# Patient Record
Sex: FEMALE | Race: WHITE | NOT HISPANIC OR LATINO | Employment: UNEMPLOYED | URBAN - METROPOLITAN AREA
[De-identification: names, ages, dates, MRNs, and addresses within clinical notes are randomized per-mention and may not be internally consistent; named-entity substitution may affect disease eponyms.]

---

## 2017-05-02 ENCOUNTER — OFFICE VISIT (OUTPATIENT)
Dept: OBGYN | Facility: CLINIC | Age: 57
End: 2017-05-02
Payer: COMMERCIAL

## 2017-05-02 VITALS
HEIGHT: 67 IN | TEMPERATURE: 98.1 F | WEIGHT: 132.8 LBS | SYSTOLIC BLOOD PRESSURE: 124 MMHG | BODY MASS INDEX: 20.84 KG/M2 | DIASTOLIC BLOOD PRESSURE: 84 MMHG

## 2017-05-02 DIAGNOSIS — Z87.42 HISTORY OF ABNORMAL CERVICAL PAP SMEAR: ICD-10-CM

## 2017-05-02 DIAGNOSIS — Z12.31 VISIT FOR SCREENING MAMMOGRAM: Primary | ICD-10-CM

## 2017-05-02 DIAGNOSIS — Z98.890 HISTORY OF LOOP ELECTRICAL EXCISION PROCEDURE (LEEP): ICD-10-CM

## 2017-05-02 DIAGNOSIS — Z71.6 ENCOUNTER FOR SMOKING CESSATION COUNSELING: ICD-10-CM

## 2017-05-02 DIAGNOSIS — Z00.00 ROUTINE GENERAL MEDICAL EXAMINATION AT A HEALTH CARE FACILITY: ICD-10-CM

## 2017-05-02 PROCEDURE — 88175 CYTOPATH C/V AUTO FLUID REDO: CPT | Performed by: OBSTETRICS & GYNECOLOGY

## 2017-05-02 PROCEDURE — 87624 HPV HI-RISK TYP POOLED RSLT: CPT | Performed by: OBSTETRICS & GYNECOLOGY

## 2017-05-02 PROCEDURE — 99386 PREV VISIT NEW AGE 40-64: CPT | Performed by: OBSTETRICS & GYNECOLOGY

## 2017-05-02 RX ORDER — VARENICLINE TARTRATE 1 MG/1
1 TABLET, FILM COATED ORAL 2 TIMES DAILY
Qty: 56 TABLET | Refills: 1 | Status: SHIPPED | OUTPATIENT
Start: 2017-05-02 | End: 2019-01-22

## 2017-05-02 NOTE — LETTER
May 12, 2017    Cait Yeager  [address] [facility] [address]    Dear Cait,  We are happy to inform you that your PAP smear result from 05/02/2017 is normal.  We are now able to do a follow up test on PAP smears. The DNA test is for HPV (Human Papilloma Virus). Cervical cancer is closely linked with certain types of HPV. Your result showed no evidence of HPV.  Therefore we recommend you return in 1 year for your next pap smear, annual exam and other preventive tests.  Please contact the clinic at [phone] with any questions.  Sincerely,    Dianne Maldonado MD

## 2017-05-02 NOTE — MR AVS SNAPSHOT
"              After Visit Summary   5/2/2017    Cait Yeager    MRN: [id number]           Patient Information     Date Of Birth          1960        Visit Information        Provider Department      5/2/2017 2:00 PM Dianne Maldonado MD [facility]        Today's Diagnoses     Visit for screening mammogram    -  1    History of abnormal cervical Pap smear        History of loop electrical excision procedure (LEEP)        Encounter for smoking cessation counseling        Routine general medical examination at a health care facility           Follow-ups after your visit        Future tests that were ordered for you today     Open Future Orders        Priority Expected Expires Ordered    MA Screening Digital Bilateral Routine  5/2/2018 5/2/2017            Who to contact     If you have questions or need follow up information about today's clinic visit or your schedule please contact [facility] directly at [phone].  Normal or non-critical lab and imaging results will be communicated to you by MyChart, letter or phone within 4 business days after the clinic has received the results. If you do not hear from us within 7 days, please contact the clinic through MyChart or phone. If you have a critical or abnormal lab result, we will notify you by phone as soon as possible.  Submit refill requests through [employer] or call your pharmacy and they will forward the refill request to us. Please allow 3 business days for your refill to be completed.          Additional Information About Your Visit        [employer] Information     [employer] lets you send messages to your doctor, view your test results, renew your prescriptions, schedule appointments and more. To sign up, go to [email or web address] . Click on \"Log in\" on the left side of the screen, which will take you to the Welcome page. Then click on \"Sign up Now\" on the right side of the page.     You will be asked to enter the access code " "listed below, as well as some personal information. Please follow the directions to create your username and password.     Your access code is: [email or web address]  Expires: 2017  4:21 PM     Your access code will  in 90 days. If you need help or a new code, please call your [facility] clinic or [phone].        Care EveryWhere ID     This is your Care EveryWhere ID. This could be used by other organizations to access your [facility] medical records  [id number]        Your Vitals Were     Temperature Height Last Period BMI (Body Mass Index)          98.1  F (36.7  C) (Oral) 5' 7\" (1.702 m) 2011 20.8 kg/m2         Blood Pressure from Last 3 Encounters:   17 124/84   10/14/15 132/86   13 108/72    Weight from Last 3 Encounters:   17 132 lb 12.8 oz (60.2 kg)   10/14/15 120 lb (54.4 kg)   13 130 lb (59 kg)              We Performed the Following     HPV High Risk Types DNA Cervical     Pap imaged thin layer diagnostic with HPV (select HPV order below)          Today's Medication Changes          These changes are accurate as of: 17  4:21 PM.  If you have any questions, ask your nurse or doctor.               Start taking these medicines.        Dose/Directions    * varenicline 0.5 MG X 11 & 1 MG X 42 tablet   Commonly known as:  CHANTIX STARTING MONTH YEISON   Used for:  Encounter for smoking cessation counseling   Started by:  Dianne Maldonado MD        Take 0.5 mg tab daily for 3 days, then 0.5 mg tab twice daily for 4 days, then 1 mg twice daily.   Quantity:  53 tablet   Refills:  0       * varenicline 1 MG tablet   Commonly known as:  CHANTIX   Used for:  Encounter for smoking cessation counseling   Started by:  Dianne Maldonado MD        Dose:  1 mg   Take 1 tablet (1 mg) by mouth 2 times daily   Quantity:  56 tablet   Refills:  1       * Notice:  This list has 2 medication(s) that are the same as other medications prescribed for you. Read the directions carefully, and ask your " doctor or other care provider to review them with you.      Stop taking these medicines if you haven't already. Please contact your care team if you have questions.     norethindrone-ethinyl estradiol 1-5 MG-MCG per tablet   Commonly known as:  FEMHRT 1/5   Stopped by:  Dianne Maldonado MD                Where to get your medicines      These medications were sent to [id number] - [place] - [address] AT [facility] & [address]  [address], [place] [address]     Phone:  [phone]     varenicline 0.5 MG X 11 & 1 MG X 42 tablet    varenicline 1 MG tablet                Primary Care Provider Office Phone # Fax #    Osman Mendoza [phone]       [facility] [address]  [facility] [address]        Thank you!     Thank you for choosing [facility]  for your care. Our goal is always to provide you with excellent care. Hearing back from our patients is one way we can continue to improve our services. Please take a few minutes to complete the written survey that you may receive in the mail after your visit with us. Thank you!             Your Updated Medication List - Protect others around you: Learn how to safely use, store and throw away your medicines at www.disposemymeds.org.          This list is accurate as of: 5/2/17  4:21 PM.  Always use your most recent med list.                   Brand Name Dispense Instructions for use    NO ACTIVE MEDICATIONS          * varenicline 0.5 MG X 11 & 1 MG X 42 tablet    CHANTIX STARTING MONTH YEISON    53 tablet    Take 0.5 mg tab daily for 3 days, then 0.5 mg tab twice daily for 4 days, then 1 mg twice daily.       * varenicline 1 MG tablet    CHANTIX    56 tablet    Take 1 tablet (1 mg) by mouth 2 times daily       * Notice:  This list has 2 medication(s) that are the same as other medications prescribed for you. Read the directions carefully, and ask your doctor or other care provider to  review them with you.

## 2017-05-02 NOTE — PROGRESS NOTES
Cait is a 57 year old  who presents for annual exam.   Postmenopausal since early 50s.  She is having no menopausal symptoms. No vaginal bleeding noted.     Besides routine health maintenance,  she would like to discuss smoking cessation. Down to 3-4 cigs/day but cannot quit on her own. Would like to try Chantix.  GYNECOLOGIC HISTORY:  She is sexually active with 1 male partner(s) and she is currently in monogamous relationship.    History sexually transmitted infections:No STD history  Estrogen replacement therapy: No    History of abnormal Pap smear: YES - updated in Problem List and Health Maintenance accordingly  Family history of breast CA: No  Family history of uterine/ovarian CA: No  Family history of colon CA: No    HEALTH MAINTENANCE:  Last Mammogram: due, ordered.  Pap; (  Lab Results   Component Value Date    PAP HSIL 2013    PAP LSIL 10/08/2012    PAP NIL 09/10/2010    )    HISTORY:  Obstetric History       T2      TAB0   SAB0   E0   M0   L2       # Outcome Date GA Lbr Mane/2nd Weight Sex Delivery Anes PTL Lv   2 Term            1 Term                 Past Medical History:   Diagnosis Date     HSIL (high grade squamous intraepithelial lesion) on Pap smear 13    NAOMI 2 & 3     LSIL (low grade squamous intraepithelial lesion) on Pap smear 10/8/12     NONSPECIFIC MEDICAL HISTORY     Lorain     Past Surgical History:   Procedure Laterality Date     C NONSPECIFIC PROCEDURE      T.L.     COLONOSCOPY  3/22/2013    Procedure: COLONOSCOPY;  Colonoscopy ;  Surgeon: Emmanuel Peterson MD;  Location: [facility]     HC ENLARGE BREAST WITH IMPLANT       LEEP TX, CERVICAL  13     Family History   Problem Relation Age of Onset     Eye Disorder Mother      cataracts     CANCER Maternal Grandfather      Social History     Social History     Marital status:      Spouse name: N/A     Number of children: N/A     Years of education: N/A     Social History Main Topics     Smoking  "status: Current Every Day Smoker     Types: Cigarettes     Last attempt to quit: 1/1/2009     Smokeless tobacco: None      Comment: Still occ smoking 3 cig/day     Alcohol use Yes      Comment: occasional     Drug use: No     Sexual activity: Yes     Partners: Female     Birth control/ protection: Surgical      Comment: KIMMY     Other Topics Concern     None     Social History Narrative       Current Outpatient Prescriptions:      varenicline (CHANTIX STARTING MONTH PAK) 0.5 MG X 11 & 1 MG X 42 tablet, Take 0.5 mg tab daily for 3 days, then 0.5 mg tab twice daily for 4 days, then 1 mg twice daily., Disp: 53 tablet, Rfl: 0     varenicline (CHANTIX) 1 MG tablet, Take 1 tablet (1 mg) by mouth 2 times daily, Disp: 56 tablet, Rfl: 1     NO ACTIVE MEDICATIONS, , Disp: , Rfl:   No Known Allergies    Past medical, surgical, social and family history were reviewed and updated in EPIC.    ROS:  12 point review of systems negative other than symptoms noted below.      EXAM:  /84 (BP Location: Right arm, Patient Position: Chair)  Temp 98.1  F (36.7  C) (Oral)  Ht 5' 7\" (1.702 m)  Wt 132 lb 12.8 oz (60.2 kg)  LMP 06/27/2011  BMI 20.8 kg/m2   BMI: Body mass index is 20.8 kg/(m^2).  Constitutional: healthy, alert and no distress  Head: Normocephalic. No masses, lesions, tenderness or abnormalities  Neck: Neck supple. Trachea midline. No adenopathy. Thyroid symmetric, normal size.   Cardiovascular: RRR.   Respiratory: Negative.   Breast: No nodularity, asymmetry or nipple discharge bilaterally.  Gastrointestinal: Abdomen soft, non-tender, non-distended. No masses, organomegaly  Vulva:  No external lesions, normal female hair distribution, no inguinal adenopathy.    Urethra:  Midline, non-tender, well supported, no discharge  Vagina:  Atrophic, no abnormal discharge, no lesions  Cervix: no lesions, no discharge  Uterus:  anteverted, smooth contour, without enlargement, mobile, and without tenderness  Ovaries:  No masses " appreciated, non-tender, mobile  Rectal Exam: deferred  Musculoskeletal: extremities normal  Skin: no suspicious lesions or rashes  Psychiatric: Affect appropriate, cooperative, mentation appears normal.     COUNSELING:   Reviewed preventive health counseling, as reflected in patient instructions       smoking cessation   reports that she has been smoking Cigarettes.  She does not have any smokeless tobacco history on file.  Tobacco Cessation Action Plan: Medication Therapy Management  (Referral to [facility])      FRAX Risk Assessment  ASSESSMENT:  57 year old  with satisfactory annual exam  (Z12.31) Visit for screening mammogram  (primary encounter diagnosis)  Comment:    Plan: MA Screening Digital Bilateral, HPV High Risk         Types DNA Cervical, Pap imaged thin layer         diagnostic with HPV (select HPV order below)            (Z87.898) History of abnormal cervical Pap smear  Comment:   Plan: HPV High Risk Types DNA Cervical, Pap imaged         thin layer diagnostic with HPV (select HPV         order below)        Will need repeat pap and HPV in one year if this is normal, then every 3 years for a total of 20 years from the LEEP.    (Z98.890) History of loop electrical excision procedure (LEEP)  Comment:   Plan: HPV High Risk Types DNA Cervical, Pap imaged         thin layer diagnostic with HPV (select HPV         order below)            (Z71.6,  Z72.0) Encounter for smoking cessation counseling  Comment:   Plan: varenicline (CHANTIX STARTING MONTH ) 0.5 MG        X 11 & 1 MG X 42 tablet, varenicline (CHANTIX)         1 MG tablet        Risks, benefits, and alternatives to the medication discussed and she would like to try. Prescription sent.    (Z00.00) Routine general medical examination at a health care facility  Comment:   Plan: Pap, mammogram updated. Follow up yearly. Pap and HPV again in one year due to LEEP.    Dianne Maldonado MD

## 2017-05-05 LAB
COPATH REPORT: NORMAL
PAP: NORMAL

## 2017-05-08 LAB
FINAL DIAGNOSIS: NORMAL
HPV HR 12 DNA CVX QL NAA+PROBE: NEGATIVE
HPV16 DNA SPEC QL NAA+PROBE: NEGATIVE
HPV18 DNA SPEC QL NAA+PROBE: NEGATIVE
SPECIMEN DESCRIPTION: NORMAL

## 2017-05-10 NOTE — PROGRESS NOTES
Pap done on 5/2/17. Please send nl pap and Neg HPV letter, ([id number]) for pap/HPV screen annual physical in 1 year.   Chart reviewed, HM updated.   Cc: provider to sign off on dx pap.     PLEASE NOTE: NEXT PAP SHOULD BE IN 1 YEAR.     BIANCA Land RN

## 2017-08-26 ENCOUNTER — HEALTH MAINTENANCE LETTER (OUTPATIENT)
Age: 57
End: 2017-08-26

## 2018-06-03 ENCOUNTER — HEALTH MAINTENANCE LETTER (OUTPATIENT)
Age: 58
End: 2018-06-03

## 2018-07-09 ENCOUNTER — HEALTH MAINTENANCE LETTER (OUTPATIENT)
Age: 58
End: 2018-07-09

## 2018-08-27 ENCOUNTER — HEALTH MAINTENANCE LETTER (OUTPATIENT)
Age: 58
End: 2018-08-27

## 2019-01-22 ENCOUNTER — OFFICE VISIT (OUTPATIENT)
Dept: FAMILY MEDICINE | Facility: CLINIC | Age: 59
End: 2019-01-22

## 2019-01-22 VITALS
HEART RATE: 75 BPM | OXYGEN SATURATION: 98 % | TEMPERATURE: 98.2 F | DIASTOLIC BLOOD PRESSURE: 60 MMHG | SYSTOLIC BLOOD PRESSURE: 110 MMHG

## 2019-01-22 DIAGNOSIS — Z12.4 SCREENING FOR CERVICAL CANCER: ICD-10-CM

## 2019-01-22 DIAGNOSIS — Z72.0 TOBACCO USE: ICD-10-CM

## 2019-01-22 DIAGNOSIS — Z11.59 NEED FOR HEPATITIS C SCREENING TEST: ICD-10-CM

## 2019-01-22 DIAGNOSIS — Z13.228 SCREENING FOR METABOLIC DISORDER: ICD-10-CM

## 2019-01-22 DIAGNOSIS — Z13.220 SCREENING FOR LIPID DISORDERS: ICD-10-CM

## 2019-01-22 DIAGNOSIS — Z98.890 HISTORY OF LOOP ELECTRICAL EXCISION PROCEDURE (LEEP): ICD-10-CM

## 2019-01-22 DIAGNOSIS — Z87.42 HISTORY OF ABNORMAL CERVICAL PAP SMEAR: ICD-10-CM

## 2019-01-22 DIAGNOSIS — Z71.89 ACP (ADVANCE CARE PLANNING): ICD-10-CM

## 2019-01-22 DIAGNOSIS — Z12.31 ENCOUNTER FOR SCREENING MAMMOGRAM FOR BREAST CANCER: ICD-10-CM

## 2019-01-22 DIAGNOSIS — Z01.419 ENCOUNTER FOR GYNECOLOGICAL EXAMINATION WITHOUT ABNORMAL FINDING: Primary | ICD-10-CM

## 2019-01-22 PROCEDURE — 88142 CYTOPATH C/V THIN LAYER: CPT | Mod: 90 | Performed by: PHYSICIAN ASSISTANT

## 2019-01-22 PROCEDURE — 99386 PREV VISIT NEW AGE 40-64: CPT | Performed by: PHYSICIAN ASSISTANT

## 2019-01-22 PROCEDURE — 36415 COLL VENOUS BLD VENIPUNCTURE: CPT | Performed by: PHYSICIAN ASSISTANT

## 2019-01-22 PROCEDURE — 87624 HPV HI-RISK TYP POOLED RSLT: CPT | Mod: 90 | Performed by: PHYSICIAN ASSISTANT

## 2019-01-22 PROCEDURE — 86803 HEPATITIS C AB TEST: CPT | Mod: 90 | Performed by: PHYSICIAN ASSISTANT

## 2019-01-22 PROCEDURE — 80061 LIPID PANEL: CPT | Mod: 90 | Performed by: PHYSICIAN ASSISTANT

## 2019-01-22 PROCEDURE — 80048 BASIC METABOLIC PNL TOTAL CA: CPT | Mod: 90 | Performed by: PHYSICIAN ASSISTANT

## 2019-01-22 SDOH — HEALTH STABILITY: MENTAL HEALTH: HOW OFTEN DO YOU HAVE 6 OR MORE DRINKS ON ONE OCCASION?: NEVER

## 2019-01-22 SDOH — HEALTH STABILITY: MENTAL HEALTH: HOW OFTEN DO YOU HAVE A DRINK CONTAINING ALCOHOL?: 2-3 TIMES A WEEK

## 2019-01-22 SDOH — HEALTH STABILITY: MENTAL HEALTH: HOW MANY STANDARD DRINKS CONTAINING ALCOHOL DO YOU HAVE ON A TYPICAL DAY?: 1 OR 2

## 2019-01-22 NOTE — PROGRESS NOTES
Chief Complaint:  Physical Exam    SUBJECTIVE:   Cait Yeager is a 58 year old female presents for routine health maintenance.    Current concerns: update fasting labs, update pap.      Menses are absent    Patient's last menstrual period was 06/27/2011.    Was last Pap smear normal: Yes  Due for mammogram:  Yes    There is no height or weight on file to calculate BMI.    Present exercise habits:  3 times/week  Present dietary habits:  eats regular meals and follows a balanced nutrition diet    Calcium intake: Unable to take supplemental  Vit D intake: is not taking supplement    Is the patient a smoker? No  If yes, smoking cessation advised and counseling provided.     Cardiovascular risk factors: none    Over the past few weeks, have you felt down or depressed? Little interest or pleasure in doing things? No concerns    If in a relationship are there any Domestic violence concern: No    Last dental appointment:  this year  Last optical appointment:  this year    Was the patient born between [birth year] and has not had Hep C testing?  Yes, test will be ordered today    I have reviewed the following histories: Past Medical History, Past Surgical History, Social History, Family History, Problem List, Medication List and Allergies    Past Medical History:   Diagnosis Date     HSIL (high grade squamous intraepithelial lesion) on Pap smear 2/18/13    NAOMI 2 & 3     LSIL (low grade squamous intraepithelial lesion) on Pap smear 10/8/12     NONSPECIFIC MEDICAL HISTORY     Mono     Family History   Problem Relation Age of Onset     Eye Disorder Mother         cataracts     Cancer Mother         adrenal? managed by [facility]     Bone Cancer Father         90s     Impaired Fasting Glucose Father      Cancer Maternal Grandfather      Social History     Socioeconomic History     Marital status:      Spouse name: Not on file     Number of children: Not on file     Years of education: Not on file     Highest education  level: Not on file   Social Needs     Financial resource strain: Not on file     Food insecurity - worry: Not on file     Food insecurity - inability: Not on file     Transportation needs - medical: Not on file     Transportation needs - non-medical: Not on file   Occupational History     Not on file   Tobacco Use     Smoking status: Current Every Day Smoker     Packs/day: 0.15     Years: 40.00     Pack years: 6.00     Types: Cigarettes     Last attempt to quit: 1/1/2009     Years since quitting: 10.0     Smokeless tobacco: Never Used     Tobacco comment: Still occ smoking 3 cig/day   Substance and Sexual Activity     Alcohol use: Yes     Frequency: 2-3 times a week     Drinks per session: 1 or 2     Binge frequency: Never     Comment: occasional     Drug use: No     Sexual activity: Yes     Partners: Female     Birth control/protection: Surgical     Comment: T.LJody   Other Topics Concern     Parent/sibling w/ CABG, MI or angioplasty before 65F 55M? Not Asked   Social History Narrative     Not on file     Past Surgical History:   Procedure Laterality Date     C NONSPECIFIC PROCEDURE  1992    T.L.     COLONOSCOPY  3/22/2013    Procedure: COLONOSCOPY;  Colonoscopy ;  Surgeon: Emmanuel Petersno MD;  Location: [facility]     HC ENLARGE BREAST WITH IMPLANT  03/12     LEEP TX, CERVICAL  5/13/13       ROS:  E/M: NEGATIVE for ear, nose, mouth and throat problems  R: NEGATIVE for significant/chronic cough or SOB  CV: NEGATIVE for chest pain or palpitations  GI: NEGATIVE for abdominal pain, chronic diarrhea or constipation  :  NEGATIVE for dysuria, hematuria or vaginal discharge. No sexual health concerns.       Current Outpatient Medications   Medication     NO ACTIVE MEDICATIONS     No current facility-administered medications for this visit.        Patient Active Problem List    Diagnosis Date Noted     History of loop electrical excision procedure (LEEP) 05/02/2017     Priority: Medium     History of abnormal cervical Pap  smear 05/02/2017     Priority: Medium     Right lateral epicondylitis 10/15/2015     Priority: Medium     Female climacteric 06/03/2013     Priority: Medium     HGSIL (high grade squamous intraepithelial dysplasia) 06/03/2013     Priority: Medium     CARDIOVASCULAR SCREENING; LDL GOAL LESS THAN 160 10/31/2010     Priority: Medium     ACP (advance care planning) 01/22/2019     Priority: Low         OBJECTIVE:  /60 (BP Location: Left arm, Patient Position: Sitting, Cuff Size: Adult Regular)   Pulse 75   Temp 98.2  F (36.8  C) (Oral)   LMP 06/27/2011   SpO2 98%         General: 58 year old female who appears her stated age. Vital signs noted.  Head: Normocephalic  Eyes: pupils equal round reactive to light and accomodation, extra ocular movements intact  Ears: external canals and TMs free of abnormalities  Nose: patent, without mucosal abnormalities  Mouth and throat: without erythema or lesions of the mucosa  Neck: supple, without adenopathy or thyromegaly  Lungs: clear to auscultation, no wheezing or crackles  Breasts: skin without rash, no dominant mass, no nipple discharge, or axillary adenopathy  Cv: regular rate and rhythm, normal s1 and s2 without murmur or click  Abd: soft, non-tender, no masses, no hepatomegaly or splenomegaly.   (female): normal female external genitalia, normal urethral meatus, vaginal mucosa, normal cervix/adnexa/uterus without masses or discharge  Ms: normal muscle tone & symmetry  Skin: clear to inspection and with no palpable abnormalities.  Neuro: sensation and motor function grossly intact; cranial nerves without obvious abnormalities.    ASSESSMENT/PLAN:    1. Encounter for gynecological examination without abnormal finding  Cait is doing well today. Will update fasting labs and pap, and send MyChart with results when available along with any further suggestions.     2. Screening for cervical cancer  - ThinPrep Pap and HPV mRna E6/E7 ([facility])    3. Encounter for screening  "mammogram for breast cancer  - Mammo Screening digital (bilat); Future    4. Screening for metabolic disorder  - VENOUS COLLECTION  - BASIC METABOLIC PANEL ([facility])    5. Screening for lipid disorders  - VENOUS COLLECTION  - Lipid Profile ([facility])    6. Need for hepatitis C screening test  - Hepatits C antibody ([facility])    7. ACP (advance care planning)       reports that she has been smoking cigarettes.  She has a 6.00 pack-year smoking history. she has never used smokeless tobacco.  Tobacco Cessation Action Plan: Information offered: Patient not interested at this time    Estimated body mass index is 20.8 kg/m  as calculated from the following:    Height as of 5/2/17: 1.702 m (5' 7\").    Weight as of 5/2/17: 60.2 kg (132 lb 12.8 oz).        Labs pending:      Fasting glucose      Fasting lipids      Hepatitis C       Pap smear  Meds Suggested:       Aspirin        Calcium  Tests Recommended:      Regular Dental Examinations        Eye exam        Mammogram yearly  Behavior Modifications:       Cardiovascular exercise 3 times per week--enough to get your Target Heart rate  Other recommendations:     BMI noted and discussed      Regular breast exam     Encouraged My Chart    Counseling Resources:  ATP IV Guidelines  Pooled Cohorts Equation Calculator  Breast Cancer Risk Calculator  FRAX Risk Assessment  ICSI Preventive Guidelines  Dietary Guidelines for Americans, 2010  [employer] MyPlate            Tia Castro PA-C  1/22/2019    "

## 2019-01-22 NOTE — LETTER
[facility]  [address]  [address]  [facility] [address]  [phone]      January 22, 2019      Cait ALBERT Yeager  [address] [facility] [address]      EMERGENCY CARE PLAN  Presenting Problem Treatment Plan   Questions or concerns during clinic hours I will call the clinic directly:    [facility]  [facility] of [facility]  [address]  [place] [address]  [phone]   Questions or concerns outside clinic hours  I will call the 24 hour line at [phone]   Patient needs to schedule an appointment  I will call the  scheduling line at [phone]   Same day treatment   I will call the clinic first, then  urgent care and/or  express care if needed   Clinic Care Coordinators Ngozi Armas, RN:  [phone]  [facility] Clinical Support Staff: [phone]    Crisis Services:  Behavioral or Mental Health B[facility] (Behavioral Health Providers)   [phone]   Emergency treatment--Immediately CALL [phone]

## 2019-01-22 NOTE — NURSING NOTE
Cait is here to establish care and for a fasting CPX with PAP.            Patient is here for a full physical exam.    Pre-Visit Screening :  Immunizations : up to date  Colon Screening : is up to date  Mammogram: is due and to be scheduled by patient  Asthma Action Test/Plan : NA  PHQ9 :  none  GAD7 :  none  Patient's  BMI There is no height or weight on file to calculate BMI.  Questioned patient about current smoking habits.  Pt. currently smokes.  Advised about smoking cessation.  OK to leave a detailed voice message regarding today's visit Yes, phone # [phone]      ETOH screening:  Questions:  1-How often do you have a drink containing alcohol?                             3 times per week(s)  2-How many drinks containing alcohol do you have on a typical day when you are         Drinking?                              1   3- How often do you have 5 or more drinks on one occasion?                              never

## 2019-01-23 LAB
BUN SERPL-MCNC: 15 MG/DL (ref 7–25)
BUN/CREATININE RATIO: NORMAL (CALC) (ref 6–22)
CALCIUM SERPL-MCNC: 9.9 MG/DL (ref 8.6–10.4)
CHLORIDE SERPLBLD-SCNC: 104 MMOL/L (ref 98–110)
CHOLEST SERPL-MCNC: 230 MG/DL
CHOLEST/HDLC SERPL: 3 (CALC)
CO2 SERPL-SCNC: 27 MMOL/L (ref 20–32)
CREAT SERPL-MCNC: 0.77 MG/DL (ref 0.5–1.05)
EGFR AFRICAN AMERICAN - QUEST: 98 ML/MIN/1.73M2
GFR SERPL CREATININE-BSD FRML MDRD: 85 ML/MIN/1.73M2
GLUCOSE - QUEST: 96 MG/DL (ref 65–99)
HCV AB - QUEST: NORMAL
HDLC SERPL-MCNC: 76 MG/DL
LDLC SERPL CALC-MCNC: 132 MG/DL (CALC)
NONHDLC SERPL-MCNC: 154 MG/DL (CALC)
POTASSIUM SERPL-SCNC: 4.7 MMOL/L (ref 3.5–5.3)
SIGNAL TO CUT OFF - QUEST: 0.03
SODIUM SERPL-SCNC: 140 MMOL/L (ref 135–146)
TRIGL SERPL-MCNC: 109 MG/DL

## 2019-01-25 LAB
CLINICAL HISTORY - QUEST: NORMAL
COMMENT - QUEST: NORMAL
CYTOTECHNOLOGIST - QUEST: NORMAL
DESCRIPTIVE DIAGNOSIS - QUEST: NORMAL
HPV MRNA E6/E7: NOT DETECTED
LAST PAP DX - QUEST: NORMAL
LMP - QUEST: NORMAL
PREV BX DX - QUEST: NORMAL
SOURCE: NORMAL
STATEMENT OF ADEQUACY - QUEST: NORMAL

## 2019-11-07 ENCOUNTER — HEALTH MAINTENANCE LETTER (OUTPATIENT)
Age: 59
End: 2019-11-07

## 2020-05-06 ENCOUNTER — OFFICE VISIT (OUTPATIENT)
Dept: FAMILY MEDICINE | Facility: CLINIC | Age: 60
End: 2020-05-06

## 2020-05-06 VITALS
TEMPERATURE: 98 F | DIASTOLIC BLOOD PRESSURE: 64 MMHG | SYSTOLIC BLOOD PRESSURE: 104 MMHG | HEIGHT: 67 IN | OXYGEN SATURATION: 99 % | HEART RATE: 109 BPM | BODY MASS INDEX: 20.03 KG/M2 | WEIGHT: 127.6 LBS

## 2020-05-06 DIAGNOSIS — R10.13 EPIGASTRIC PAIN: Primary | ICD-10-CM

## 2020-05-06 PROCEDURE — 99213 OFFICE O/P EST LOW 20 MIN: CPT | Performed by: FAMILY MEDICINE

## 2020-05-06 ASSESSMENT — MIFFLIN-ST. JEOR: SCORE: 1168.48

## 2020-05-06 NOTE — PROGRESS NOTES
Subjective     Cait Higgins is a 60 year old female who presents to clinic today for the following health issues:    HPI   ABDOMINAL   PAIN     Onset: 3 episodes over 2 years    Description:   Character: Stabbing  Location: epigastric region  Radiation: upper Back    Intensity: moderate    Progression of Symptoms:  Intermittent episodes , lasts about an hour until recent episode which lasted a few days    Accompanying Signs & Symptoms:  Fever/Chills?: no   Gas/Bloating: no   Nausea: no   Vomitting: no   Diarrhea?: no   Constipation:no   Dysuria or Hematuria: no    History:   Trauma: no   Previous similar pain: YES- x 2   Previous tests done: none    Precipitating factors:   Does the pain change with:     Food: YES- has noted in all cases right after eating lunch too fast- feels like food just sitting in stomach     BM: no     Urination: no     Alleviating factors:  Seems to improve with ibuprofen and peptobismol    Therapies Tried and outcome:     LMP:  not applicable         Patient Active Problem List   Diagnosis     CARDIOVASCULAR SCREENING; LDL GOAL LESS THAN 160     Female climacteric     Right lateral epicondylitis     History of loop electrical excision procedure (LEEP)     History of abnormal cervical Pap smear     ACP (advance care planning)     Tobacco use     Past Surgical History:   Procedure Laterality Date     C NONSPECIFIC PROCEDURE      T.L.     COLONOSCOPY  3/22/2013    Procedure: COLONOSCOPY;  Colonoscopy ;  Surgeon: Emmanuel Peterson MD;  Location: [facility]     HC ENLARGE BREAST WITH IMPLANT       LEEP TX, CERVICAL  13       Social History     Tobacco Use     Smoking status: Current Every Day Smoker     Packs/day: 0.15     Years: 40.00     Pack years: 6.00     Types: Cigarettes     Last attempt to quit: 2009     Years since quittin.3     Smokeless tobacco: Never Used     Tobacco comment: Still occ smoking 3 cig/day   Substance Use Topics     Alcohol use: Yes     Frequency:  "2-3 times a week     Drinks per session: 1 or 2     Binge frequency: Never     Comment: occasional     Family History   Problem Relation Age of Onset     Eye Disorder Mother         cataracts     Cancer Mother         adrenal? managed by [facility]     Bone Cancer Father         90s     Impaired Fasting Glucose Father      Cancer Maternal Grandfather          No current outpatient medications on file.     No Known Allergies  Recent Labs   Lab Test 01/22/19  0949 03/07/12  0728   *  --    HDL 76  --    TRIG 109  --    CR 0.77  --    GFRESTIMATED 85  --    POTASSIUM 4.7 4.6      BP Readings from Last 3 Encounters:   05/06/20 104/64   01/22/19 110/60   05/02/17 124/84    Wt Readings from Last 3 Encounters:   05/06/20 57.9 kg (127 lb 9.6 oz)   05/02/17 60.2 kg (132 lb 12.8 oz)   10/14/15 54.4 kg (120 lb)                      Reviewed and updated as needed this visit by Provider         Review of Systems   ROS COMP: Constitutional, HEENT, cardiovascular, pulmonary, gi and gu systems are negative, except as otherwise noted.      Objective    /64 (BP Location: Left arm, Patient Position: Sitting, Cuff Size: Adult Regular)   Pulse 109   Temp 98  F (36.7  C) (Oral)   Ht 1.689 m (5' 6.5\")   Wt 57.9 kg (127 lb 9.6 oz)   LMP 06/27/2011   SpO2 99%   BMI 20.29 kg/m    Body mass index is 20.29 kg/m .  Physical Exam   GENERAL: healthy, alert and no distress  EYES: Eyes grossly normal to inspection, PERRL and conjunctivae and sclerae normal  HENT: ear canals and TM's normal, nose and mouth without ulcers or lesions  NECK: no adenopathy, no asymmetry, masses, or scars and thyroid normal to palpation  RESP: lungs clear to auscultation - no rales, rhonchi or wheezes  CV: regular rate and rhythm, normal S1 S2, no S3 or S4, no murmur, click or rub, no peripheral edema and peripheral pulses strong  ABDOMEN: soft, mildly tender epigastric, neg Cushing sign, no hepatosplenomegaly, no masses and bowel sounds normal  MS: " no gross musculoskeletal defects noted, no edema  SKIN: no suspicious lesions or rashes  NEURO: Normal strength and tone, mentation intact and speech normal  PSYCH: mentation appears normal, affect normal/bright    Diagnostic Test Results:  Labs reviewed in Epic        Assessment & Plan   Assessment      Plan  (R10.13) Epigastric pain  (primary encounter diagnosis)  Comment: suspect acid related issue that is acute after eating too fast-may be esophageal spasm-no worrisome findings to suspect ulcer or AAA as has occurred 3 times in last 2 years and not progressing.  No symptoms at present  Plan: recommend she try Pepcid immediately with any recurrence, if not helping could consider short course PPI but given remote episodes suspct this will not be worth it    If not better let me know    patient given instructions to go to emergency department immediately if worsening of symptoms and verbalizes this understanding     Tobacco Cessation:   reports that she has been smoking cigarettes. She has a 6.00 pack-year smoking history. She has never used smokeless tobacco.              No follow-ups on file.    Emmanuel Piedra MD  [facility]

## 2020-05-06 NOTE — PATIENT INSTRUCTIONS
Purchase pepcid over the counter-take one pill twice daily as needed    If not available get Tums and take those    If worsening let me know

## 2020-05-28 ENCOUNTER — DOCUMENTATION ONLY (OUTPATIENT)
Dept: OTHER | Facility: CLINIC | Age: 60
End: 2020-05-28

## 2020-05-28 PROBLEM — Z71.89 ACP (ADVANCE CARE PLANNING): Status: RESOLVED | Noted: 2019-01-22 | Resolved: 2020-05-28

## 2020-11-29 ENCOUNTER — HEALTH MAINTENANCE LETTER (OUTPATIENT)
Age: 60
End: 2020-11-29

## 2021-09-25 ENCOUNTER — HEALTH MAINTENANCE LETTER (OUTPATIENT)
Age: 61
End: 2021-09-25

## 2022-01-15 ENCOUNTER — HEALTH MAINTENANCE LETTER (OUTPATIENT)
Age: 62
End: 2022-01-15

## 2022-12-26 ENCOUNTER — HEALTH MAINTENANCE LETTER (OUTPATIENT)
Age: 62
End: 2022-12-26

## 2023-04-07 ENCOUNTER — OFFICE VISIT (OUTPATIENT)
Dept: FAMILY MEDICINE | Facility: CLINIC | Age: 63
End: 2023-04-07

## 2023-04-07 VITALS
HEART RATE: 76 BPM | BODY MASS INDEX: 19.77 KG/M2 | SYSTOLIC BLOOD PRESSURE: 118 MMHG | WEIGHT: 123 LBS | TEMPERATURE: 98 F | HEIGHT: 66 IN | RESPIRATION RATE: 20 BRPM | DIASTOLIC BLOOD PRESSURE: 82 MMHG

## 2023-04-07 DIAGNOSIS — Z13.820 SCREENING FOR OSTEOPOROSIS: ICD-10-CM

## 2023-04-07 DIAGNOSIS — Z12.31 ENCOUNTER FOR SCREENING MAMMOGRAM FOR BREAST CANCER: ICD-10-CM

## 2023-04-07 DIAGNOSIS — Z12.11 SCREENING FOR COLON CANCER: ICD-10-CM

## 2023-04-07 DIAGNOSIS — Z12.83 SCREENING FOR SKIN CANCER: ICD-10-CM

## 2023-04-07 DIAGNOSIS — F17.200 TOBACCO DEPENDENCE: ICD-10-CM

## 2023-04-07 DIAGNOSIS — Z13.220 SCREENING FOR LIPOID DISORDERS: ICD-10-CM

## 2023-04-07 DIAGNOSIS — Z12.4 SCREENING FOR CERVICAL CANCER: ICD-10-CM

## 2023-04-07 DIAGNOSIS — Z01.419 ENCOUNTER FOR GYNECOLOGICAL EXAMINATION WITHOUT ABNORMAL FINDING: Primary | ICD-10-CM

## 2023-04-07 DIAGNOSIS — Z13.228 SCREENING FOR METABOLIC DISORDER: ICD-10-CM

## 2023-04-07 LAB
BUN SERPL-MCNC: 14 MG/DL (ref 7–25)
BUN/CREATININE RATIO: 18.4 (ref 6–22)
CALCIUM SERPL-MCNC: 9.5 MG/DL (ref 8.6–10.3)
CHLORIDE SERPLBLD-SCNC: 101.6 MMOL/L (ref 98–110)
CHOLEST SERPL-MCNC: 239 MG/DL (ref 0–199)
CHOLEST/HDLC SERPL: 3 {RATIO} (ref 0–5)
CO2 SERPL-SCNC: 28.8 MMOL/L (ref 20–32)
CREAT SERPL-MCNC: 0.76 MG/DL (ref 0.6–1.3)
GLUCOSE SERPL-MCNC: 97 MG/DL (ref 60–99)
HDLC SERPL-MCNC: 84 MG/DL (ref 40–150)
LDLC SERPL CALC-MCNC: 133 MG/DL (ref 0–130)
POTASSIUM SERPL-SCNC: 4.26 MMOL/L (ref 3.5–5.3)
SODIUM SERPL-SCNC: 138.7 MMOL/L (ref 135–146)
TRIGL SERPL-MCNC: 108 MG/DL (ref 0–149)

## 2023-04-07 PROCEDURE — 36415 COLL VENOUS BLD VENIPUNCTURE: CPT | Performed by: PHYSICIAN ASSISTANT

## 2023-04-07 PROCEDURE — 80061 LIPID PANEL: CPT | Performed by: PHYSICIAN ASSISTANT

## 2023-04-07 PROCEDURE — 80048 BASIC METABOLIC PNL TOTAL CA: CPT | Performed by: PHYSICIAN ASSISTANT

## 2023-04-07 PROCEDURE — 88142 CYTOPATH C/V THIN LAYER: CPT | Mod: 90 | Performed by: PHYSICIAN ASSISTANT

## 2023-04-07 PROCEDURE — 99396 PREV VISIT EST AGE 40-64: CPT | Performed by: PHYSICIAN ASSISTANT

## 2023-04-07 PROCEDURE — 87624 HPV HI-RISK TYP POOLED RSLT: CPT | Mod: 90 | Performed by: PHYSICIAN ASSISTANT

## 2023-04-07 RX ORDER — MULTIVITAMIN
1 TABLET ORAL DAILY
COMMUNITY
Start: 2023-04-07

## 2023-04-07 RX ORDER — CHOLECALCIFEROL (VITAMIN D3) 50 MCG
1 TABLET ORAL DAILY
COMMUNITY
Start: 2023-04-07

## 2023-04-07 RX ORDER — VARENICLINE TARTRATE 1 MG/1
1 TABLET, FILM COATED ORAL 2 TIMES DAILY
Qty: 60 TABLET | Refills: 4 | Status: SHIPPED | OUTPATIENT
Start: 2023-04-07

## 2023-04-07 NOTE — PROGRESS NOTES
Chief Complaint:  Physical Exam    SUBJECTIVE:   Cait Higgins is a 62 year old female presents for routine health maintenance.    Current concerns: Would like to update fasting labs, pap, and bone scan. Will like referral for derm skin check    Menses are absent    Patient's last menstrual period was 06/27/2011.  Was last Pap smear normal: Yes  Due for mammogram:  Yes    Body mass index is 19.67 kg/m .    Present exercise habits:  Much less active in winter  Present dietary habits:  eats regular meals and follows a balanced nutrition diet    Calcium intake:  Takes MV  Vit D intake: is taking supplement    Is the patient a smoker? Yes  If yes, smoking cessation advised and counseling provided.     Cardiovascular risk factors: smoking    Over the past few weeks, have you felt down or depressed? Little interest or pleasure in doing things? No concerns    If in a relationship are there any Domestic violence concern: No    Last dental appointment:  this year  Last optical appointment:  this year    Was the patient born between [id number] and has not had Hep C testing?  Has been tested    I have reviewed the following histories: Past Medical History, Past Surgical History, Social History, Family History, Problem List, Medication List and Allergies    Past Medical History:   Diagnosis Date     HSIL (high grade squamous intraepithelial lesion) on Pap smear 2/18/13    NAOMI 2 & 3     LSIL (low grade squamous intraepithelial lesion) on Pap smear 10/8/12     NONSPECIFIC MEDICAL HISTORY     Mono     Family History   Problem Relation Age of Onset     Eye Disorder Mother         cataracts     Cancer Mother         adrenal? managed by [facility]     Bone Cancer Father         90s     Impaired Fasting Glucose Father      Thyroid Disease Sister      Lung Cancer Maternal Grandfather      Social History     Socioeconomic History     Marital status:      Spouse name: Not on file     Number of children: Not on file     Years of  education: Not on file     Highest education level: Not on file   Occupational History     Not on file   Tobacco Use     Smoking status: Every Day     Packs/day: 0.50     Years: 40.00     Pack years: 20.00     Types: Cigarettes     Last attempt to quit: 2009     Years since quittin.2     Passive exposure: Current     Smokeless tobacco: Never   Vaping Use     Vaping status: Not on file   Substance and Sexual Activity     Alcohol use: Yes     Comment: occasional     Drug use: No     Sexual activity: Yes     Partners: Female     Birth control/protection: Surgical     Comment: T.L.   Other Topics Concern     Parent/sibling w/ CABG, MI or angioplasty before 65F 55M? Not Asked   Social History Narrative     Not on file     Social Determinants of Health     Financial Resource Strain: Not on file   Food Insecurity: Not on file   Transportation Needs: Not on file   Physical Activity: Not on file   Stress: Not on file   Social Connections: Not on file   Intimate Partner Violence: Not on file   Housing Stability: Not on file     Past Surgical History:   Procedure Laterality Date     COLONOSCOPY  3/22/2013    Procedure: COLONOSCOPY;  Colonoscopy ;  Surgeon: Emmanuel Peterson MD;  Location: [facility]     HC ENLARGE BREAST WITH IMPLANT       LEEP TX, CERVICAL  13     ZZC NONSPECIFIC PROCEDURE      T.L.       ROS:  E/M: NEGATIVE for ear, nose, mouth and throat problems  R: NEGATIVE for significant/chronic cough or SOB  CV: NEGATIVE for chest pain or palpitations  GI: NEGATIVE for abdominal pain, chronic diarrhea or constipation  :  NEGATIVE for dysuria, hematuria or vaginal discharge. No sexual health concerns.       Current Outpatient Medications   Medication     multivitamin (ONE-DAILY) tablet     varenicline (CHANTIX YEISON) 0.5 MG X 11 & 1 MG X 42 tablet     varenicline (CHANTIX) 1 MG tablet     vitamin D3 (CHOLECALCIFEROL) 50 mcg (2000 units) tablet     No current facility-administered medications for  "this visit.       Patient Active Problem List    Diagnosis Date Noted     Tobacco use 01/22/2019     Priority: Medium     History of loop electrical excision procedure (LEEP) 05/02/2017     Priority: Medium     History of abnormal cervical Pap smear 05/02/2017     Priority: Medium     Right lateral epicondylitis 10/15/2015     Priority: Medium     Female climacteric 06/03/2013     Priority: Medium     CARDIOVASCULAR SCREENING; LDL GOAL LESS THAN 160 10/31/2010     Priority: Medium         OBJECTIVE:  /82 (BP Location: Left arm, Patient Position: Chair, Cuff Size: Adult Regular)   Pulse 76   Temp 98  F (36.7  C) (Temporal)   Resp 20   Ht 1.684 m (5' 6.3\")   Wt 55.8 kg (123 lb)   LMP 06/27/2011   BMI 19.67 kg/m      General: 62 year old female who appears her stated age. Vital signs noted   Head: Normocephalic  Eyes: pupils equal round reactive to light and accomodation, extra ocular movements intact  Ears: external canals and TMs free of abnormalities  Nose: patent, without mucosal abnormalities  Mouth and throat: without erythema or lesions of the mucosa  Neck: supple, without adenopathy or thyromegaly  Lungs: clear to auscultation, no wheezing or crackles  Breasts: skin without rash, no dominant mass, no nipple discharge, or axillary adenopathy  Cv: regular rate and rhythm, normal s1 and s2 without murmur or click  Abd: soft, non-tender, no masses, no hepatomegaly or splenomegaly.   (female): normal female external genitalia, normal urethral meatus, vaginal mucosa, normal cervix/adnexa/uterus without masses or discharge  Ms: normal muscle tone & symmetry  Skin: clear to inspection and with no palpable abnormalities.  Neuro: sensation and motor function grossly intact; cranial nerves without obvious abnormalities.    ASSESSMENT/PLAN:    Encounter for gynecological examination without abnormal finding  Cait is doing well today. Will update fasting labs, pap, and send MyChart with results when " "available. Will also order updated mammogram, colonoscopy, and order repeat dexa from 2012    Screening for cervical cancer  - ThinPrep Pap and HPV (mRNa E6/E7) ([facility])    Tobacco dependence  Agreed to start on Chantix. Discussed starter pack versus continuing pack. Also emphasized that Chantix is more likely to lead to long term smoking cessation if patients stay on medication for 6 months, before taper off, taking 1/2 tablet twice daily for several weeks before stopping.  Ideally quit smoking completely between weeks 2-4. Warned patient of side effects of Chantix including headache, insomnia, dream changes, nausea, vomiting, and to contact me if noted. No FH history of seizures.   - varenicline (CHANTIX YEISON) 0.5 MG X 11 & 1 MG X 42 tablet; Take 0.5 mg tab daily for 3 days, THEN 0.5 mg tab twice daily for 4 days, THEN 1 mg twice daily.  - varenicline (CHANTIX) 1 MG tablet; Take 1 tablet (1 mg) by mouth 2 times daily  - Dexa hip/pelvis/spine*  - Radiology Referral; Future    Screening for metabolic disorder  - VENOUS COLLECTION  - Basic Metabolic Panel ([facility])    Screening for lipoid disorders  - VENOUS COLLECTION  - Lipid Panel ([facility])    Screening for osteoporosis  - Dexa hip/pelvis/spine*    Screening for skin cancer  - Adult Derrmatology Referral; Future    Encounter for screening mammogram for breast cancer  - MA Screening with Implants Bilateral w/ Xander; Future  - Radiology Referral; Future    Screening for colon cancer  - Colonoscopy Screening  Referral     reports that she has been smoking cigarettes. She has a 20.00 pack-year smoking history. She has been exposed to tobacco smoke. She has never used smokeless tobacco.    Estimated body mass index is 19.67 kg/m  as calculated from the following:    Height as of this encounter: 1.684 m (5' 6.3\").    Weight as of this encounter: 55.8 kg (123 lb).      Labs pending:      Fasting glucose      Fasting lipids  Meds Suggested:      Vitamin D       " Calcium  Tests Recommended:      Regular Dental Examinations       Colonoscopy due, .patient  will schedule       Eye exam        Mammogram yearly  Behavior Modifications:       Cardiovascular exercise 3 times per week--enough to get your Target Heart rate  Other recommendations:     BMI noted and discussed      Regular breast exam     Encouraged My Chart    Counseling Resources:  ATP IV Guidelines  Pooled Cohorts Equation Calculator  Breast Cancer Risk Calculator  FRAX Risk Assessment  ICSI Preventive Guidelines  Dietary Guidelines for Americans, 2010  [employer]'s MyPlate            Tia Batista PA-C  4/7/2023

## 2023-04-07 NOTE — NURSING NOTE
Cait Higgins is here for a CPX.    Pre-visit planning  Immunizations -Tdap  Colonoscopy -is due and ordered today  Mammogram -is due and ordered today  Asthma test --  PHQ9 -  SAROJ 7 -      Questioned patient about current smoking habits.  Pt. currently smokes.  Advised about smoking cessation.  Body mass index is 19.67 kg/m .  PULSE regular  My Chart: active  CLASSIFICATION OF OVERWEIGHT AND OBESITY BY BMI                        Obesity Class           BMI(kg/m2)  Underweight                                    < 18.5  Normal                                         18.5-24.9  Overweight                                     25.0-29.9  OBESITY                     I                  30.0-34.9                             II                 35.0-39.9  EXTREME OBESITY             III                >40                            Patient's  BMI Body mass index is 19.67 kg/m .      The patient has verbalized that it is ok to leave a detailed voice message on the patient's cell phone with results/recommendations from this visit.

## 2023-04-16 ENCOUNTER — HEALTH MAINTENANCE LETTER (OUTPATIENT)
Age: 63
End: 2023-04-16

## 2023-04-18 ENCOUNTER — TRANSFERRED RECORDS (OUTPATIENT)
Dept: FAMILY MEDICINE | Facility: CLINIC | Age: 63
End: 2023-04-18

## 2024-06-23 ENCOUNTER — HEALTH MAINTENANCE LETTER (OUTPATIENT)
Age: 64
End: 2024-06-23

## 2024-08-12 NOTE — NURSING NOTE
Cait is here today for upper abdominal discomfort.    Pre-visit Screening:  Immunizations:  up to date  Colonoscopy:  is up to date  Mammogram: is up to date  Asthma Action Test/Plan:  PAULETTE  PHQ9:  NA  GAD7:  NA  Questioned patient about current smoking habits Pt. smokes .  Ok to leave detailed message on voice mail for today's visit only Yes, phone # [phone]       Called and spoke with patient. I let her know that she had an upcoming appointment in September and that was the soonest available appt. That I can add her to the waiting list and she was fine with that.

## 2025-02-17 ENCOUNTER — PATIENT OUTREACH (OUTPATIENT)
Dept: CARE COORDINATION | Facility: CLINIC | Age: 65
End: 2025-02-17
Payer: MEDICARE

## 2025-02-25 ENCOUNTER — HOSPITAL ENCOUNTER (OUTPATIENT)
Dept: MAMMOGRAPHY | Facility: CLINIC | Age: 65
Discharge: HOME | End: 2025-02-25
Attending: NURSE PRACTITIONER
Payer: MEDICARE

## 2025-02-25 DIAGNOSIS — Z12.31 VISIT FOR SCREENING MAMMOGRAM: ICD-10-CM

## 2025-02-25 DIAGNOSIS — Z98.82 HX OF BREAST IMPLANT: ICD-10-CM

## 2025-02-25 DIAGNOSIS — Z12.31 ENCOUNTER FOR SCREENING MAMMOGRAM FOR BREAST CANCER: ICD-10-CM

## 2025-02-25 PROCEDURE — 77067 SCR MAMMO BI INCL CAD: CPT
